# Patient Record
(demographics unavailable — no encounter records)

---

## 2024-10-09 NOTE — REASON FOR VISIT
[FreeTextEntry1] :  Pt presents to the [facility] today for COVID 19 Vaccine mRNA 2024/2025 Formula